# Patient Record
Sex: FEMALE | Race: WHITE | HISPANIC OR LATINO | URBAN - METROPOLITAN AREA
[De-identification: names, ages, dates, MRNs, and addresses within clinical notes are randomized per-mention and may not be internally consistent; named-entity substitution may affect disease eponyms.]

---

## 2017-04-19 ENCOUNTER — INPATIENT (INPATIENT)
Facility: HOSPITAL | Age: 72
LOS: 5 days | Discharge: HOME | End: 2017-04-25
Attending: INTERNAL MEDICINE | Admitting: INTERNAL MEDICINE
Payer: MEDICARE

## 2017-04-19 VITALS
RESPIRATION RATE: 18 BRPM | HEART RATE: 75 BPM | SYSTOLIC BLOOD PRESSURE: 190 MMHG | OXYGEN SATURATION: 100 % | TEMPERATURE: 98 F | DIASTOLIC BLOOD PRESSURE: 98 MMHG

## 2017-04-19 DIAGNOSIS — E78.5 HYPERLIPIDEMIA, UNSPECIFIED: ICD-10-CM

## 2017-04-19 DIAGNOSIS — I63.9 CEREBRAL INFARCTION, UNSPECIFIED: ICD-10-CM

## 2017-04-19 DIAGNOSIS — Z41.8 ENCOUNTER FOR OTHER PROCEDURES FOR PURPOSES OTHER THAN REMEDYING HEALTH STATE: ICD-10-CM

## 2017-04-19 DIAGNOSIS — I10 ESSENTIAL (PRIMARY) HYPERTENSION: ICD-10-CM

## 2017-04-19 DIAGNOSIS — E11.9 TYPE 2 DIABETES MELLITUS WITHOUT COMPLICATIONS: ICD-10-CM

## 2017-04-19 DIAGNOSIS — Z90.49 ACQUIRED ABSENCE OF OTHER SPECIFIED PARTS OF DIGESTIVE TRACT: Chronic | ICD-10-CM

## 2017-04-19 LAB
ALBUMIN SERPL ELPH-MCNC: 4.1 G/DL — SIGNIFICANT CHANGE UP (ref 3.3–5)
ALP SERPL-CCNC: 67 U/L — SIGNIFICANT CHANGE UP (ref 40–120)
ALT FLD-CCNC: 15 U/L — SIGNIFICANT CHANGE UP (ref 4–33)
APTT BLD: 31.6 SEC — SIGNIFICANT CHANGE UP (ref 27.5–37.4)
AST SERPL-CCNC: 28 U/L — SIGNIFICANT CHANGE UP (ref 4–32)
BASOPHILS # BLD AUTO: 0.03 K/UL — SIGNIFICANT CHANGE UP (ref 0–0.2)
BASOPHILS NFR BLD AUTO: 0.2 % — SIGNIFICANT CHANGE UP (ref 0–2)
BILIRUB SERPL-MCNC: 0.4 MG/DL — SIGNIFICANT CHANGE UP (ref 0.2–1.2)
BUN SERPL-MCNC: 23 MG/DL — SIGNIFICANT CHANGE UP (ref 7–23)
CALCIUM SERPL-MCNC: 10 MG/DL — SIGNIFICANT CHANGE UP (ref 8.4–10.5)
CHLORIDE SERPL-SCNC: 97 MMOL/L — LOW (ref 98–107)
CK MB BLD-MCNC: 1.22 NG/ML — SIGNIFICANT CHANGE UP (ref 1–4.7)
CK MB BLD-MCNC: SIGNIFICANT CHANGE UP (ref 0–2.5)
CK SERPL-CCNC: 88 U/L — SIGNIFICANT CHANGE UP (ref 25–170)
CO2 SERPL-SCNC: 27 MMOL/L — SIGNIFICANT CHANGE UP (ref 22–31)
CREAT SERPL-MCNC: 1.19 MG/DL — SIGNIFICANT CHANGE UP (ref 0.5–1.3)
EOSINOPHIL # BLD AUTO: 0.2 K/UL — SIGNIFICANT CHANGE UP (ref 0–0.5)
EOSINOPHIL NFR BLD AUTO: 1.4 % — SIGNIFICANT CHANGE UP (ref 0–6)
GLUCOSE SERPL-MCNC: 121 MG/DL — HIGH (ref 70–99)
HCT VFR BLD CALC: 40.9 % — SIGNIFICANT CHANGE UP (ref 34.5–45)
HGB BLD-MCNC: 13.1 G/DL — SIGNIFICANT CHANGE UP (ref 11.5–15.5)
IMM GRANULOCYTES NFR BLD AUTO: 0.3 % — SIGNIFICANT CHANGE UP (ref 0–1.5)
INR BLD: 0.96 — SIGNIFICANT CHANGE UP (ref 0.88–1.17)
LYMPHOCYTES # BLD AUTO: 20.9 % — SIGNIFICANT CHANGE UP (ref 13–44)
LYMPHOCYTES # BLD AUTO: 3.02 K/UL — SIGNIFICANT CHANGE UP (ref 1–3.3)
MCHC RBC-ENTMCNC: 30 PG — SIGNIFICANT CHANGE UP (ref 27–34)
MCHC RBC-ENTMCNC: 32 % — SIGNIFICANT CHANGE UP (ref 32–36)
MCV RBC AUTO: 93.6 FL — SIGNIFICANT CHANGE UP (ref 80–100)
MONOCYTES # BLD AUTO: 1.21 K/UL — HIGH (ref 0–0.9)
MONOCYTES NFR BLD AUTO: 8.4 % — SIGNIFICANT CHANGE UP (ref 2–14)
NEUTROPHILS # BLD AUTO: 9.94 K/UL — HIGH (ref 1.8–7.4)
NEUTROPHILS NFR BLD AUTO: 68.8 % — SIGNIFICANT CHANGE UP (ref 43–77)
PLATELET # BLD AUTO: 340 K/UL — SIGNIFICANT CHANGE UP (ref 150–400)
PMV BLD: 10.3 FL — SIGNIFICANT CHANGE UP (ref 7–13)
POTASSIUM SERPL-MCNC: 3.8 MMOL/L — SIGNIFICANT CHANGE UP (ref 3.5–5.3)
POTASSIUM SERPL-SCNC: 3.8 MMOL/L — SIGNIFICANT CHANGE UP (ref 3.5–5.3)
PROT SERPL-MCNC: 8.4 G/DL — HIGH (ref 6–8.3)
PROTHROM AB SERPL-ACNC: 10.7 SEC — SIGNIFICANT CHANGE UP (ref 9.8–13.1)
RBC # BLD: 4.37 M/UL — SIGNIFICANT CHANGE UP (ref 3.8–5.2)
RBC # FLD: 12.9 % — SIGNIFICANT CHANGE UP (ref 10.3–14.5)
SODIUM SERPL-SCNC: 141 MMOL/L — SIGNIFICANT CHANGE UP (ref 135–145)
TROPONIN T SERPL-MCNC: < 0.06 NG/ML — SIGNIFICANT CHANGE UP (ref 0–0.06)
WBC # BLD: 14.45 K/UL — HIGH (ref 3.8–10.5)
WBC # FLD AUTO: 14.45 K/UL — HIGH (ref 3.8–10.5)

## 2017-04-19 PROCEDURE — 71010: CPT | Mod: 26

## 2017-04-19 PROCEDURE — 70450 CT HEAD/BRAIN W/O DYE: CPT | Mod: 26

## 2017-04-19 PROCEDURE — 99223 1ST HOSP IP/OBS HIGH 75: CPT | Mod: GC

## 2017-04-19 RX ORDER — INSULIN LISPRO 100/ML
VIAL (ML) SUBCUTANEOUS AT BEDTIME
Qty: 0 | Refills: 0 | Status: DISCONTINUED | OUTPATIENT
Start: 2017-04-19 | End: 2017-04-25

## 2017-04-19 RX ORDER — HEPARIN SODIUM 5000 [USP'U]/ML
5000 INJECTION INTRAVENOUS; SUBCUTANEOUS EVERY 12 HOURS
Qty: 0 | Refills: 0 | Status: DISCONTINUED | OUTPATIENT
Start: 2017-04-19 | End: 2017-04-25

## 2017-04-19 RX ORDER — DEXTROSE 50 % IN WATER 50 %
25 SYRINGE (ML) INTRAVENOUS ONCE
Qty: 0 | Refills: 0 | Status: DISCONTINUED | OUTPATIENT
Start: 2017-04-19 | End: 2017-04-25

## 2017-04-19 RX ORDER — INSULIN LISPRO 100/ML
VIAL (ML) SUBCUTANEOUS
Qty: 0 | Refills: 0 | Status: DISCONTINUED | OUTPATIENT
Start: 2017-04-19 | End: 2017-04-25

## 2017-04-19 RX ORDER — SODIUM CHLORIDE 9 MG/ML
1000 INJECTION, SOLUTION INTRAVENOUS
Qty: 0 | Refills: 0 | Status: DISCONTINUED | OUTPATIENT
Start: 2017-04-19 | End: 2017-04-25

## 2017-04-19 RX ORDER — ACETAMINOPHEN 500 MG
650 TABLET ORAL ONCE
Qty: 0 | Refills: 0 | Status: COMPLETED | OUTPATIENT
Start: 2017-04-19 | End: 2017-04-19

## 2017-04-19 RX ORDER — ASPIRIN/CALCIUM CARB/MAGNESIUM 324 MG
81 TABLET ORAL DAILY
Qty: 0 | Refills: 0 | Status: DISCONTINUED | OUTPATIENT
Start: 2017-04-19 | End: 2017-04-25

## 2017-04-19 RX ORDER — ONDANSETRON 8 MG/1
4 TABLET, FILM COATED ORAL ONCE
Qty: 0 | Refills: 0 | Status: COMPLETED | OUTPATIENT
Start: 2017-04-19 | End: 2017-04-19

## 2017-04-19 RX ORDER — DEXTROSE 50 % IN WATER 50 %
1 SYRINGE (ML) INTRAVENOUS ONCE
Qty: 0 | Refills: 0 | Status: DISCONTINUED | OUTPATIENT
Start: 2017-04-19 | End: 2017-04-25

## 2017-04-19 RX ORDER — GLUCAGON INJECTION, SOLUTION 0.5 MG/.1ML
1 INJECTION, SOLUTION SUBCUTANEOUS ONCE
Qty: 0 | Refills: 0 | Status: DISCONTINUED | OUTPATIENT
Start: 2017-04-19 | End: 2017-04-25

## 2017-04-19 RX ORDER — DEXTROSE 50 % IN WATER 50 %
12.5 SYRINGE (ML) INTRAVENOUS ONCE
Qty: 0 | Refills: 0 | Status: DISCONTINUED | OUTPATIENT
Start: 2017-04-19 | End: 2017-04-25

## 2017-04-19 RX ORDER — ATORVASTATIN CALCIUM 80 MG/1
40 TABLET, FILM COATED ORAL AT BEDTIME
Qty: 0 | Refills: 0 | Status: DISCONTINUED | OUTPATIENT
Start: 2017-04-19 | End: 2017-04-20

## 2017-04-19 RX ORDER — HYDRALAZINE HCL 50 MG
5 TABLET ORAL ONCE
Qty: 0 | Refills: 0 | Status: COMPLETED | OUTPATIENT
Start: 2017-04-19 | End: 2017-04-19

## 2017-04-19 RX ADMIN — Medication 5 MILLIGRAM(S): at 20:45

## 2017-04-19 RX ADMIN — Medication 650 MILLIGRAM(S): at 18:56

## 2017-04-19 RX ADMIN — ONDANSETRON 4 MILLIGRAM(S): 8 TABLET, FILM COATED ORAL at 17:45

## 2017-04-19 NOTE — ED PROVIDER NOTE - PHYSICAL EXAMINATION
Locurto as below  +  right visual field defect  no temp artery tenderness  no TA opulses b/l Locurto as below  +  right visual field defect  no temp artery tenderness  no TA opulses b/l  Rosario: right homonymous hemianopsia, no motor deficits. Full ROM of neck, non-tender.

## 2017-04-19 NOTE — ED ADULT NURSE NOTE - OBJECTIVE STATEMENT
Float RN- Pt received to spot #7. AAOx4, c/o severe left sided headache acc with nausea and dizziness. Pt c/o decreased in vision since last night. Denies recent head injury/trauma. No stroke code called after being evaluated by MD. #20g IV placed to left ac, labs drawn and sent. Awaiting MD ramos. Report given off to primary RN in area. Family member at bedside. no acute distress. Awaiting CT and further plan of care.

## 2017-04-19 NOTE — H&P ADULT. - PROBLEM SELECTOR PLAN 2
Metoprolol and losartan held for permissive hypertension for 24 hours.   Routine blood pressure check.  DASH diet

## 2017-04-19 NOTE — H&P ADULT. - NEGATIVE CARDIOVASCULAR SYMPTOMS
no paroxysmal nocturnal dyspnea/no dyspnea on exertion/no chest pain/no palpitations/no peripheral edema/no orthopnea

## 2017-04-19 NOTE — H&P ADULT. - GASTROINTESTINAL DETAILS
nontender/no distention/no guarding/no masses palpable/bowel sounds normal/no rigidity/normal/soft/no rebound tenderness

## 2017-04-19 NOTE — ED PROVIDER NOTE - PROGRESS NOTE DETAILS
DD ED ATTG Stroke eval - 71F p/w R homonymous hemianopia x 6 hours since 10 am, but it has been happening intermittently x 1 day.  Also c/o R sided HA gradually worsening x 1 day.  Outside tPA window, will not call code stroke now, will order CT head. Called by radiology attending regarding non-con CT head showing acute stroke in left occipital region. COde stroke called but patient outside of intervention window.

## 2017-04-19 NOTE — ED PROVIDER NOTE - ATTENDING CONTRIBUTION TO CARE
Locurto  pt with sxs and CT c/w occipital infarct   By time  pt out of acute therapeutic window for TPA , michele lan Locurto  pt with sxs and CT c/w occipital infarct   By time  pt out of acute therapeutic window for TPA , neuro consulted

## 2017-04-19 NOTE — ED ADULT TRIAGE NOTE - CHIEF COMPLAINT QUOTE
pt c/o nausea and headache on lt side of head since last PM--pt has decreased vision in rt eye since 10am--pt evaluated for cva--no code stroke

## 2017-04-19 NOTE — ED ADULT NURSE REASSESSMENT NOTE - NS ED NURSE REASSESS COMMENT FT1
Pt received on stretcher in room 7 with family at bedside. Pt is awake, A&Ox4, NAD observed, respirations even and unlabored on room air. VS recorded. Elevated /73, hydralazine 5mg IVP administered as ordered. IV access 20G in left AC, dressing is clean, dry, intact. No redness, warmth, swelling, or pain at site. Pt on cardiac monitor, NSR observed, HR 71. Pt admitted to [facility] for Stroke, awaiting bed assignment.

## 2017-04-19 NOTE — ED PROVIDER NOTE - MEDICAL DECISION MAKING DETAILS
72 yo female with PMH of HTN, HLD, DM, c/o left sided HA  intermittent decreased vision from left eye which began yesterday and was intermittent  Visual change became constant since 10 am today. Code stroke activated but pt outside of TPA or interventional IR window. Plan: EKG, CT head, labs, neuro, zofran, admit

## 2017-04-19 NOTE — H&P ADULT. - HISTORY OF PRESENT ILLNESS
70 y/o F with h/o HTN, HLD, DM presents to the ED for left sided headache and decreased vision loss from her right eye. Pt states the headache started first and then her vision in her right eye started to deteriorate around 10 am this morning. Pt reports she can't see out of the corner of her right eye. Pt reports normal vision in her left eye. Pt report vision has not improved since then. Pt states she had an episode of nausea and vomiting before, nonbloody and nonbilious. Pt denies syncope, LOC, dizziness, fever, chills, chest pain, shortness of breath, palpitations, decreased sensation, facial droop, slurred speech, weakness, dysuria, urinary/bowel incontinence or any other complaints at this time. 70 y/o F with h/o HTN, HLD, DM presents to the ED for left sided headache and decreased vision loss from her right eye. Pt states the headache started first and then her vision in her right eye started to deteriorate around 10 am this morning. Pt reports she can't see out of the corner of her right eye. Pt reports normal vision in her left eye. Pt report vision has not improved since then. Pt states she had an episode of nausea and vomiting before, nonbloody and nonbilious. Pt denies syncope, LOC, dizziness, fever, chills, chest pain, shortness of breath, palpitations, decreased sensation, facial droop, slurred speech, weakness, dysuria, urinary/bowel incontinence or any other complaints at this time.     In the ED, patient was seen and evaluated. Pt's CT head show acute infarct of left occipital lobe. Pt was seen and evaluated by neurology who recommended a stroke work up. Pt is currently stable and in no acute distress. Vitals are HR 68 / 75, R 17, Sp02 100%. Pt is admitted to telemetry.

## 2017-04-19 NOTE — H&P ADULT. - PROBLEM SELECTOR PLAN 1
Admit to telemetry.  Neurology consult appreciated- MRI brain w/o contrast, MRA head w/o contrast, MRA neck w/ contrast, TTE with bubbly study, ASA, lipitor. Permissive HTN up to 205/110 for 24 hours. Neuro checks every 4 hours.   check cbc, tsh, lipid, hemoglobin a1c, bmp with mag and phos,  f/u MD note Admit to telemetry.  Neurology consult appreciated- MRI brain w/o contrast, MRA head w/o contrast, MRA neck w/ contrast, TTE with bubbly study, ASA, lipitor. Permissive HTN up to 205/110 for 24 hours. Neuro checks every 4 hours.   check cbc, tsh, lipid, hemoglobin a1c, bmp with mag and phos,

## 2017-04-19 NOTE — H&P ADULT. - RS GEN PE MLT RESP DETAILS PC
no chest wall tenderness/no intercostal retractions/airway patent/no wheezes/clear to auscultation bilaterally/no rhonchi/normal/breath sounds equal/good air movement/respirations non-labored/no rales

## 2017-04-19 NOTE — H&P ADULT. - NEGATIVE NEUROLOGICAL SYMPTOMS
no focal seizures/no loss of consciousness/no tremors/no syncope/no loss of sensation/no generalized seizures/no vertigo/no hemiparesis/no confusion/no difficulty walking/no transient paralysis/no facial palsy/no paresthesias/no weakness

## 2017-04-19 NOTE — H&P ADULT. - ASSESSMENT
70 y/o F with h/o HTN, HLD, DM presents to the ED for left sided headache and decreased vision loss from her right eye.Admit to telemetry for CVA.

## 2017-04-19 NOTE — ED PROVIDER NOTE - OBJECTIVE STATEMENT
pt c/o left sided HA  intermittent decreased vision from left eye which began yesterday and was intermittent  Visual change became constant since 10 am today.  Pt denies weakness numbness gait difficulty  chest pain or SOB  Pt denies prior h/o CVA 72 yo female with PMH of HTN, HLD, DM, c/o left sided HA  intermittent decreased vision from left eye which began yesterday and was intermittent  Visual change became constant since 10 am today.  Pt denies weakness numbness gait difficulty  chest pain or SOB  Pt denies prior h/o CVA. Not on anticoagulation other than ASA. Denies head trauma. + nausea, vomiting in ED exam room. Zofran ordered. Code stroke called and neuro at bedside.

## 2017-04-19 NOTE — ED ADULT NURSE REASSESSMENT NOTE - NS ED NURSE REASSESS COMMENT FT1
Pt received bed assignment in [facility], pt transferred to [facility] at 23:30. Report given to receiving OSCAR Oakley.

## 2017-04-19 NOTE — H&P ADULT. - PROBLEM SELECTOR PLAN 4
Check hemoglobin a1c.   Stop PO medications and start sliding scale.   Routine glucose check.   Low carb diet

## 2017-04-20 LAB
APPEARANCE UR: CLEAR — SIGNIFICANT CHANGE UP
BACTERIA # UR AUTO: SIGNIFICANT CHANGE UP
BILIRUB UR-MCNC: NEGATIVE — SIGNIFICANT CHANGE UP
BLOOD UR QL VISUAL: NEGATIVE — SIGNIFICANT CHANGE UP
BUN SERPL-MCNC: 23 MG/DL — SIGNIFICANT CHANGE UP (ref 7–23)
CALCIUM SERPL-MCNC: 9.7 MG/DL — SIGNIFICANT CHANGE UP (ref 8.4–10.5)
CHLORIDE SERPL-SCNC: 99 MMOL/L — SIGNIFICANT CHANGE UP (ref 98–107)
CHOLEST SERPL-MCNC: 196 MG/DL — SIGNIFICANT CHANGE UP (ref 120–199)
CK MB BLD-MCNC: 1.04 NG/ML — SIGNIFICANT CHANGE UP (ref 1–4.7)
CK SERPL-CCNC: 83 U/L — SIGNIFICANT CHANGE UP (ref 25–170)
CO2 SERPL-SCNC: 26 MMOL/L — SIGNIFICANT CHANGE UP (ref 22–31)
COLOR SPEC: SIGNIFICANT CHANGE UP
CREAT SERPL-MCNC: 1.18 MG/DL — SIGNIFICANT CHANGE UP (ref 0.5–1.3)
GLUCOSE SERPL-MCNC: 148 MG/DL — HIGH (ref 70–99)
GLUCOSE UR-MCNC: NEGATIVE — SIGNIFICANT CHANGE UP
HBA1C BLD-MCNC: 8.4 % — HIGH (ref 4–5.6)
HCT VFR BLD CALC: 41 % — SIGNIFICANT CHANGE UP (ref 34.5–45)
HDLC SERPL-MCNC: 51 MG/DL — SIGNIFICANT CHANGE UP (ref 45–65)
HGB BLD-MCNC: 13.1 G/DL — SIGNIFICANT CHANGE UP (ref 11.5–15.5)
KETONES UR-MCNC: NEGATIVE — SIGNIFICANT CHANGE UP
LEUKOCYTE ESTERASE UR-ACNC: NEGATIVE — SIGNIFICANT CHANGE UP
LIPID PNL WITH DIRECT LDL SERPL: 108 MG/DL — SIGNIFICANT CHANGE UP
MAGNESIUM SERPL-MCNC: 1.6 MG/DL — SIGNIFICANT CHANGE UP (ref 1.6–2.6)
MCHC RBC-ENTMCNC: 29.6 PG — SIGNIFICANT CHANGE UP (ref 27–34)
MCHC RBC-ENTMCNC: 32 % — SIGNIFICANT CHANGE UP (ref 32–36)
MCV RBC AUTO: 92.6 FL — SIGNIFICANT CHANGE UP (ref 80–100)
MUCOUS THREADS # UR AUTO: SIGNIFICANT CHANGE UP
NITRITE UR-MCNC: NEGATIVE — SIGNIFICANT CHANGE UP
PH UR: 6 — SIGNIFICANT CHANGE UP (ref 4.6–8)
PHOSPHATE SERPL-MCNC: 5 MG/DL — HIGH (ref 2.5–4.5)
PLATELET # BLD AUTO: 308 K/UL — SIGNIFICANT CHANGE UP (ref 150–400)
PMV BLD: 10.3 FL — SIGNIFICANT CHANGE UP (ref 7–13)
POTASSIUM SERPL-MCNC: 3.8 MMOL/L — SIGNIFICANT CHANGE UP (ref 3.5–5.3)
POTASSIUM SERPL-SCNC: 3.8 MMOL/L — SIGNIFICANT CHANGE UP (ref 3.5–5.3)
PROT UR-MCNC: NEGATIVE — SIGNIFICANT CHANGE UP
RBC # BLD: 4.43 M/UL — SIGNIFICANT CHANGE UP (ref 3.8–5.2)
RBC # FLD: 13.2 % — SIGNIFICANT CHANGE UP (ref 10.3–14.5)
RBC CASTS # UR COMP ASSIST: SIGNIFICANT CHANGE UP (ref 0–?)
SODIUM SERPL-SCNC: 138 MMOL/L — SIGNIFICANT CHANGE UP (ref 135–145)
SP GR SPEC: 1.01 — SIGNIFICANT CHANGE UP (ref 1–1.03)
SQUAMOUS # UR AUTO: SIGNIFICANT CHANGE UP
TRIGL SERPL-MCNC: 317 MG/DL — HIGH (ref 10–149)
TROPONIN T SERPL-MCNC: < 0.06 NG/ML — SIGNIFICANT CHANGE UP (ref 0–0.06)
TSH SERPL-MCNC: 1.78 UIU/ML — SIGNIFICANT CHANGE UP (ref 0.27–4.2)
UROBILINOGEN FLD QL: NORMAL E.U. — SIGNIFICANT CHANGE UP (ref 0.1–0.2)
WBC # BLD: 10.06 K/UL — SIGNIFICANT CHANGE UP (ref 3.8–10.5)
WBC # FLD AUTO: 10.06 K/UL — SIGNIFICANT CHANGE UP (ref 3.8–10.5)
WBC UR QL: SIGNIFICANT CHANGE UP (ref 0–?)

## 2017-04-20 PROCEDURE — 93280 PM DEVICE PROGR EVAL DUAL: CPT | Mod: 26

## 2017-04-20 PROCEDURE — 70548 MR ANGIOGRAPHY NECK W/DYE: CPT | Mod: 26

## 2017-04-20 PROCEDURE — 99233 SBSQ HOSP IP/OBS HIGH 50: CPT

## 2017-04-20 PROCEDURE — 70551 MRI BRAIN STEM W/O DYE: CPT | Mod: 26

## 2017-04-20 PROCEDURE — 99223 1ST HOSP IP/OBS HIGH 75: CPT

## 2017-04-20 PROCEDURE — 70544 MR ANGIOGRAPHY HEAD W/O DYE: CPT | Mod: 26,59

## 2017-04-20 RX ORDER — ATORVASTATIN CALCIUM 80 MG/1
80 TABLET, FILM COATED ORAL AT BEDTIME
Qty: 0 | Refills: 0 | Status: DISCONTINUED | OUTPATIENT
Start: 2017-04-20 | End: 2017-04-25

## 2017-04-20 RX ADMIN — HEPARIN SODIUM 5000 UNIT(S): 5000 INJECTION INTRAVENOUS; SUBCUTANEOUS at 06:29

## 2017-04-20 RX ADMIN — Medication 1: at 22:38

## 2017-04-20 RX ADMIN — ATORVASTATIN CALCIUM 80 MILLIGRAM(S): 80 TABLET, FILM COATED ORAL at 22:40

## 2017-04-20 RX ADMIN — Medication 81 MILLIGRAM(S): at 12:04

## 2017-04-20 RX ADMIN — HEPARIN SODIUM 5000 UNIT(S): 5000 INJECTION INTRAVENOUS; SUBCUTANEOUS at 18:08

## 2017-04-20 RX ADMIN — Medication: at 12:05

## 2017-04-20 NOTE — DISCHARGE NOTE ADULT - PATIENT PORTAL LINK FT
“You can access the [employer] Patient Portal, offered by [facility], by registering with the following website: [email or web address]”

## 2017-04-20 NOTE — DISCHARGE NOTE ADULT - ADDITIONAL INSTRUCTIONS
follow up with Dr. Gonzalez in 1-2 weeks   follow up with Neurologist in 1-2 weeks follow up with Dr. Gonzalez in 1-2 weeks   follow up with Neurologist in 1-2 weeks  --> Dr Wyatt Chatterjee -- neurosurgery -- monitoring of aneurysms -- [phone]

## 2017-04-20 NOTE — DISCHARGE NOTE ADULT - MEDICATION SUMMARY - MEDICATIONS TO TAKE
I will START or STAY ON the medications listed below when I get home from the hospital:    aspirin 81 mg oral tablet  -- 1 tab(s) by mouth once a day  -- Indication: For CVA (cerebral vascular accident)    losartan 50 mg oral tablet  -- 1 tab(s) by mouth once a day  -- Indication: For Hypertension    metFORMIN 1000 mg oral tablet  -- 1 tab(s) by mouth 2 times a day  -- Indication: For Diabetes    glipiZIDE 10 mg oral tablet  -- 1 tab(s) by mouth 2 times a day  -- Indication: For Diabetes    atorvastatin 80 mg oral tablet  -- 1 tab(s) by mouth once a day (at bedtime)  -- Indication: For Hyperlipidemia    metoprolol succinate 50 mg oral tablet, extended release  -- 1 tab(s) by mouth once a day  -- Indication: For Hypertension I will START or STAY ON the medications listed below when I get home from the hospital:    aspirin 81 mg oral tablet  -- 1 tab(s) by mouth once a day  -- Indication: For CVA (cerebral vascular accident)    losartan 50 mg oral tablet  -- 1 tab(s) by mouth once a day  -- Indication: For Hypertension    metFORMIN 1000 mg oral tablet  -- 1 tab(s) by mouth 2 times a day  -- Indication: For Diabetes    glipiZIDE 10 mg oral tablet  -- 1 tab(s) by mouth 2 times a day  -- Indication: For Diabetes    atorvastatin 80 mg oral tablet  -- 1 tab(s) by mouth once a day (at bedtime)  -- Indication: For Hyperlipidemia    clopidogrel 75 mg oral tablet  -- 1 tab(s) by mouth once a day x 3 weeks   -- Indication: For CVA (cerebral vascular accident) I will START or STAY ON the medications listed below when I get home from the hospital:    aspirin 81 mg oral tablet  -- 1 tab(s) by mouth once a day  -- Indication: For CVA (cerebral vascular accident)    losartan 25 mg oral tablet  -- 3 tab(s) by mouth once a day  -- Indication: For Hypertension    metFORMIN 1000 mg oral tablet  -- 1 tab(s) by mouth 2 times a day  -- Indication: For Diabetes    glipiZIDE 10 mg oral tablet  -- 1 tab(s) by mouth 2 times a day  -- Indication: For Diabetes    atorvastatin 80 mg oral tablet  -- 1 tab(s) by mouth once a day (at bedtime)  -- Indication: For Hyperlipidemia, CVA( stroke)     clopidogrel 75 mg oral tablet  -- 1 tab(s) by mouth once a day x 3 weeks   -- Indication: For CVA (cerebral vascular accident) I will START or STAY ON the medications listed below when I get home from the hospital:    aspirin 81 mg oral tablet  -- 1 tab(s) by mouth once a day  -- Indication: For CVA (cerebral vascular accident)    losartan 100 mg oral tablet  -- 1 tab(s) by mouth once a day  -- Indication: For HTN    metFORMIN 1000 mg oral tablet  -- 1 tab(s) by mouth 2 times a day  -- Indication: For Diabetes    glipiZIDE 10 mg oral tablet  -- 1 tab(s) by mouth 2 times a day  -- Indication: For Diabetes    atorvastatin 80 mg oral tablet  -- 1 tab(s) by mouth once a day (at bedtime)  -- Indication: For Hyperlipidemia, CVA( stroke)     clopidogrel 75 mg oral tablet  -- 1 tab(s) by mouth once a day x 3 weeks   -- Indication: For CVA (cerebral vascular accident) I will START or STAY ON the medications listed below when I get home from the hospital:    aspirin 81 mg oral tablet  -- 1 tab(s) by mouth once a day  -- Indication: For CVA (cerebral vascular accident)    losartan 100 mg oral tablet  -- 1 tab(s) by mouth once a day  -- Indication: For HTN    metFORMIN 1000 mg oral tablet  -- 1 tab(s) by mouth 2 times a day  -- Indication: For Diabetes    glipiZIDE 10 mg oral tablet  -- 1 tab(s) by mouth 2 times a day  -- Indication: For Diabetes    atorvastatin 80 mg oral tablet  -- 1 tab(s) by mouth once a day (at bedtime)  -- Indication: For Hyperlipidemia, CVA( stroke)     clopidogrel 75 mg oral tablet  -- 1 tab(s) by mouth once a day x 30 days  -- Indication: For CAD

## 2017-04-20 NOTE — OCCUPATIONAL THERAPY INITIAL EVALUATION ADULT - PERTINENT HX OF CURRENT PROBLEM, REHAB EVAL
Pt is a 71 year old F with hx of HTN, HLD, & DM who presents to [facility] on 4/19/17 with left sided headache and decreased vision loss from her right eye. CT Head No Contrast on 4/19/17 displays findings are consistent with an acute infarct of the left occipital lobe, without hemorrhagic transformation.

## 2017-04-20 NOTE — OCCUPATIONAL THERAPY INITIAL EVALUATION ADULT - MD ORDER
Occupational Therapy to evaluate and treat. Occupational Therapy to evaluate and treat. OOB with assistance. Ambulate with assistance. Increase as Tolerated. Per OSCAR Cueva, pt is okay to participate in OT evaluation and perform activity as tolerated.

## 2017-04-20 NOTE — PHYSICAL THERAPY INITIAL EVALUATION ADULT - PERTINENT HX OF CURRENT PROBLEM, REHAB EVAL
72 y/o F with h/o HTN, HLD, DM presents to the ED for left sided headache and decreased vision loss from her right eye. Pt states the headache started first and then her vision in her right eye started to deteriorate around 10 am this morning. Pt reports she can't see out of the corner of her right eye. Pt reports normal vision in her left eye. In the ED, patient was seen and evaluated. Pt's CT head show acute infarct of left occipital lobe.

## 2017-04-20 NOTE — DISCHARGE NOTE ADULT - MEDICATION SUMMARY - MEDICATIONS TO STOP TAKING
I will STOP taking the medications listed below when I get home from the hospital:  None I will STOP taking the medications listed below when I get home from the hospital:    metoprolol succinate 50 mg oral tablet, extended release  -- 1 tab(s) by mouth once a day

## 2017-04-20 NOTE — OCCUPATIONAL THERAPY INITIAL EVALUATION ADULT - PLANNED THERAPY INTERVENTIONS, OT EVAL
balance training/ADL retraining/strengthening/cognitive, visual perceptual/bed mobility training/ROM/transfer training

## 2017-04-20 NOTE — DISCHARGE NOTE ADULT - NS AS DC STROKE ED MATERIALS
Call 911 for Stroke/Prescribed Medications/Need for Followup After Discharge/Risk Factors for Stroke/Stroke Warning Signs and Symptoms/Stroke Education Booklet

## 2017-04-20 NOTE — OCCUPATIONAL THERAPY INITIAL EVALUATION ADULT - LEVEL OF INDEPENDENCE: SIT/SUPINE, REHAB EVAL
NT; Pt. left sitting at EOB as received. NAD. Call bell in reach. All lines intact & all precautions maintained. OSCAR Cueva made aware & acknowledged. Family  bedside.

## 2017-04-20 NOTE — DISCHARGE NOTE ADULT - NS AS ACTIVITY OBS
activity as tolerated/Stairs allowed/Walking-Outdoors allowed/Walking-Indoors allowed/Showering allowed

## 2017-04-20 NOTE — DISCHARGE NOTE ADULT - HOSPITAL COURSE
72 y/o F with h/o HTN, HLD, DM presents to the ED for left sided headache and decreased vision loss from her right eye. Pt states the headache started first and then her vision in her right eye started to deteriorate around 10 am this morning. Pt reports she can't see out of the corner of her right eye. Pt reports normal vision in her left eye. Pt report vision has not improved since then. Pt states she had an episode of nausea and vomiting before, nonbloody and nonbilious. Pt denies syncope, LOC, dizziness, fever, chills, chest pain, shortness of breath, palpitations, decreased sensation, facial droop, slurred speech, weakness, dysuria, urinary/bowel incontinence or any other complaints at this time.     In the ED, patient was seen and evaluated. Pt's CT head show acute infarct of left occipital lobe. Pt was seen and evaluated by neurology who recommended a stroke work up. Pt is currently stable and in no acute distress. Vitals are HR 68 / 75, R 17, Sp02 100%. Pt is admitted to telemetry.     EKG: NSR @ 63 bpm TWI in I, II,V4-V6, Q wave in AVL, and V2   CE x 2 neg           WBC 14.45             UA negs  4/19 CXR - clear lungs  4/19 CT Head - The exam findings are most consistent with an acute infarct of the left occipital lobe, without hemorrhagic transformation.  4/19 [facility] c/s - neuro check every 4 hours, MRI brain w/o contrast, MRA head w/o contrast and neck w/ contrast, Echo with bubble study, hemoglobin a1c, lipid, Permissive HTN up to 205/110, Loop recorder  4/20/17 > MRI Brain / MRA H / N >> MRI brain: Left medial occipital lobe acute infarct without hemorrhagic transformation.  MRA brain: Loss of signal of the left P2 and P3 segments indicating   high-grade stenosis. Additional focal stenosis at the origin of the left   P1. Other vascular stenoses as described above.  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  MRA neck: Tortuosity of the great vessels which may be seen with   hypertension.  4/20/17 > MRI + left occipital infarct w/o ICS + left ICA aneurysm, CVA ? embolic TTE w bubble study, Neurosx c/s, ILR, Permissive HTN 24-48 hrs,     4/20/17 > Stroke service - neuro - Neurosx c/s suspect she'll benefit from elective treatment of aneurysms   4/20/17 > NeuroSx - Left occipital CVA found to have high grade stenosis, left ICA aneurysms, - no evidence of rupture no acute neurosurgical intervention, management per Neurology for acute stroke, once d/c pt to see Dr Wyatt Chatterjee as OP for close aneurysm f/u   4/20/17 - EP - ILR for tomorrow  4/21 EP: s/p ILR 70 y/o F with h/o HTN, HLD, DM presents to the ED for left sided headache and decreased vision loss from her right eye. Pt states the headache started first and then her vision in her right eye started to deteriorate around 10 am this morning. Pt reports she can't see out of the corner of her right eye. Pt reports normal vision in her left eye. Pt report vision has not improved since then. Pt states she had an episode of nausea and vomiting before, nonbloody and nonbilious. Pt denies syncope, LOC, dizziness, fever, chills, chest pain, shortness of breath, palpitations, decreased sensation, facial droop, slurred speech, weakness, dysuria, urinary/bowel incontinence or any other complaints at this time.     In the ED, patient was seen and evaluated. Pt's CT head show acute infarct of left occipital lobe. Pt was seen and evaluated by neurology who recommended a stroke work up. Pt is currently stable and in no acute distress. Vitals are HR 68 / 75, R 17, Sp02 100%. Pt is admitted to telemetry.     EKG: NSR @ 63 bpm TWI in I, II,V4-V6, Q wave in AVL, and V2   CE x 2 neg           WBC 14.45             UA negs  4/19 CXR - clear lungs  4/19 CT Head - The exam findings are most consistent with an acute infarct of the left occipital lobe, without hemorrhagic transformation.  4/19 [facility] c/s - neuro check every 4 hours, MRI brain w/o contrast, MRA head w/o contrast and neck w/ contrast, Echo with bubble study, hemoglobin a1c, lipid, Permissive HTN up to 205/110, Loop recorder  4/20/17 > MRI Brain / MRA H / N >> MRI brain: Left medial occipital lobe acute infarct without hemorrhagic transformation.  MRA brain: Loss of signal of the left P2 and P3 segments indicating   high-grade stenosis. Additional focal stenosis at the origin of the left   P1. Other vascular stenoses as described above.  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  MRA neck: Tortuosity of the great vessels which may be seen with   hypertension.  4/20/17 > MRI + left occipital infarct w/o ICS + left ICA aneurysm, CVA ? embolic TTE w bubble study, Neurosx c/s, ILR, Permissive HTN 24-48 hrs,     4/20/17 > Stroke service - neuro - Neurosx c/s suspect she'll benefit from elective treatment of aneurysms   4/20/17 > NeuroSx - Left occipital CVA found to have high grade stenosis, left ICA aneurysms, - no evidence of rupture no acute neurosurgical intervention, management per Neurology for acute stroke, once d/c pt to see Dr Wyatt Chatterjee as OP for close aneurysm f/u   4/20/17 - EP - ILR for tomorrow  4/21 EP: s/p ILR    Patient medically cleared for discharge, continue current meds as prescribed, follow up with Neurologist and NeuroSurgery. 72 y/o F with h/o HTN, HLD, DM presents to the ED for left sided headache and decreased vision loss from her right eye. Pt states the headache started first and then her vision in her right eye started to deteriorate around 10 am this morning. Pt reports she can't see out of the corner of her right eye. Pt reports normal vision in her left eye. Pt report vision has not improved since then. Pt states she had an episode of nausea and vomiting before, nonbloody and nonbilious. Pt denies syncope, LOC, dizziness, fever, chills, chest pain, shortness of breath, palpitations, decreased sensation, facial droop, slurred speech, weakness, dysuria, urinary/bowel incontinence or any other complaints at this time.     In the ED, patient was seen and evaluated. Pt's CT head show acute infarct of left occipital lobe. Pt was seen and evaluated by neurology who recommended a stroke work up. Pt is currently stable and in no acute distress. Vitals are HR 68 / 75, R 17, Sp02 100%. Pt is admitted to telemetry.     EKG: NSR @ 63 bpm TWI in I, II,V4-V6, Q wave in AVL, and V2   CE x 2 neg           WBC 14.45             UA negs  4/19 CXR - clear lungs  4/19 CT Head - The exam findings are most consistent with an acute infarct of the left occipital lobe, without hemorrhagic transformation.  4/19 [facility] c/s - neuro check every 4 hours, MRI brain w/o contrast, MRA head w/o contrast and neck w/ contrast, Echo with bubble study, hemoglobin a1c, lipid, Permissive HTN up to 205/110, Loop recorder  4/20/17 > MRI Brain / MRA H / N >> MRI brain: Left medial occipital lobe acute infarct without hemorrhagic transformation.  MRA brain: Loss of signal of the left P2 and P3 segments indicating   high-grade stenosis. Additional focal stenosis at the origin of the left   P1. Other vascular stenoses as described above.  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  MRA neck: Tortuosity of the great vessels which may be seen with   hypertension.  4/20/17 > MRI + left occipital infarct w/o ICS + left ICA aneurysm, CVA ? embolic TTE w bubble study, Neurosx c/s, ILR, Permissive HTN 24-48 hrs,     4/20/17 > Stroke service - neuro - Neurosx c/s suspect she'll benefit from elective treatment of aneurysms   4/20/17 > NeuroSx - Left occipital CVA found to have high grade stenosis, left ICA aneurysms, - no evidence of rupture no acute neurosurgical intervention, management per Neurology for acute stroke, once d/c pt to see Dr Wyatt Chatterjee as OP for close aneurysm f/u   4/20/17 - EP - ILR for tomorrow  4/21 EP: s/p ILR  s/p Echo: --------------------------------------------    Patient medically cleared for discharge, continue current meds as prescribed, follow up with Neurologist and NeuroSurgery. 70 y/o F with h/o HTN, HLD, DM presents to the ED for left sided headache and decreased vision loss from her right eye. Pt states the headache started first and then her vision in her right eye started to deteriorate around 10 am this morning. Pt reports she can't see out of the corner of her right eye. Pt reports normal vision in her left eye. Pt report vision has not improved since then. Pt states she had an episode of nausea and vomiting before, nonbloody and nonbilious. Pt denies syncope, LOC, dizziness, fever, chills, chest pain, shortness of breath, palpitations, decreased sensation, facial droop, slurred speech, weakness, dysuria, urinary/bowel incontinence or any other complaints at this time.     In the ED, patient was seen and evaluated. Pt's CT head show acute infarct of left occipital lobe. Pt was seen and evaluated by neurology who recommended a stroke work up. Pt is currently stable and in no acute distress. Vitals are HR 68 / 75, R 17, Sp02 100%. Pt is admitted to telemetry.     EKG: NSR @ 63 bpm TWI in I, II,V4-V6, Q wave in AVL, and V2   CE x 2 neg           WBC 14.45             UA negs  4/19 CXR - clear lungs  4/19 CT Head - The exam findings are most consistent with an acute infarct of the left occipital lobe, without hemorrhagic transformation.  4/19 [facility] c/s - neuro check every 4 hours, MRI brain w/o contrast, MRA head w/o contrast and neck w/ contrast, Echo with bubble study, hemoglobin a1c, lipid, Permissive HTN up to 205/110, Loop recorder  4/20/17 > MRI Brain / MRA H / N >> MRI brain: Left medial occipital lobe acute infarct without hemorrhagic transformation.  MRA brain: Loss of signal of the left P2 and P3 segments indicating   high-grade stenosis. Additional focal stenosis at the origin of the left   P1. Other vascular stenoses as described above.  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  MRA neck: Tortuosity of the great vessels which may be seen with   hypertension.  4/20/17 > MRI + left occipital infarct w/o ICS + left ICA aneurysm, CVA ? embolic TTE w bubble study, Neurosx c/s, ILR, Permissive HTN 24-48 hrs,     4/20/17 > Stroke service - neuro - Neurosx c/s suspect she'll benefit from elective treatment of aneurysms   4/20/17 > NeuroSx - Left occipital CVA found to have high grade stenosis, left ICA aneurysms, - no evidence of rupture no acute neurosurgical intervention, management per Neurology for acute stroke, once d/c pt to see Dr Wyatt Chatterjee as OP for close aneurysm f/u     4/21 EP: s/p ILR  s/p Echo: --------------------------------------------    Patient medically cleared for discharge, continue current meds as prescribed, follow up with Neurologist and NeuroSurgery. 72 y/o F with h/o HTN, HLD, DM presents to the ED for left sided headache and decreased vision loss from her right eye. Pt states the headache started first and then her vision in her right eye started to deteriorate around 10 am this morning. Pt reports she can't see out of the corner of her right eye. Pt reports normal vision in her left eye. Pt report vision has not improved since then. Pt states she had an episode of nausea and vomiting before, nonbloody and nonbilious. Pt denies syncope, LOC, dizziness, fever, chills, chest pain, shortness of breath, palpitations, decreased sensation, facial droop, slurred speech, weakness, dysuria, urinary/bowel incontinence or any other complaints at this time.     In the ED, patient was seen and evaluated. Pt's CT head show acute infarct of left occipital lobe. Pt was seen and evaluated by neurology who recommended a stroke work up. Pt is currently stable and in no acute distress. Vitals are HR 68 / 75, R 17, Sp02 100%. Pt is admitted to telemetry.     EKG: NSR @ 63 bpm TWI in I, II,V4-V6, Q wave in AVL, and V2   CE x 2 neg           WBC 14.45             UA negs  4/19 CXR - clear lungs  4/19 CT Head - The exam findings are most consistent with an acute infarct of the left occipital lobe, without hemorrhagic transformation.  4/19 [facility] c/s - neuro check every 4 hours, MRI brain w/o contrast, MRA head w/o contrast and neck w/ contrast, Echo with bubble study, hemoglobin a1c, lipid, Permissive HTN up to 205/110, Loop recorder  4/20/17 > MRI Brain / MRA H / N >> MRI brain: Left medial occipital lobe acute infarct without hemorrhagic transformation.  MRA brain: Loss of signal of the left P2 and P3 segments indicating   high-grade stenosis. Additional focal stenosis at the origin of the left   P1. Other vascular stenoses as described above.  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  MRA neck: Tortuosity of the great vessels which may be seen with   hypertension.  4/20/17 > MRI + left occipital infarct w/o ICS + left ICA aneurysm, CVA ? embolic TTE w bubble study, Neurosx c/s, ILR, Permissive HTN 24-48 hrs,     4/20/17 > Stroke service - neuro - Neurosx c/s suspect she'll benefit from elective treatment of aneurysms   4/20/17 > NeuroSx - Left occipital CVA found to have high grade stenosis, left ICA aneurysms, - no evidence of rupture no acute neurosurgical intervention, management per Neurology for acute stroke, once d/c pt to see Dr Wyatt Chatterjee as OP for close aneurysm f/u     4/21 EP: s/p ILR  s/p Echo: LVEF 71% Calcified trileaflet aortic valve with decreased opening. Peak transaortic valve gradient equals 24 mm Hg, mean transaortic valve gradient equals 14 mm Hg, consistent with mild aortic stenosis. Mild aortic regurgitation. 2. Mildly dilated left atrium.  LA volume index = 35 cc/m2. 3. Eccentric left ventricular hypertrophy (dilated leftventricle with normal relative wall thickness). 4. Normal left ventricular systolic function. No segmental wall motion abnormalities. 5. Mild diastolic dysfunction (Stage I). 6. Normal right ventricular size and function.      Patient medically cleared for discharge, continue current meds as prescribed, follow up with Neurologist and NeuroSurgery.

## 2017-04-20 NOTE — DISCHARGE NOTE ADULT - PLAN OF CARE
prevent further stroke continue aspirin and lipitor   follow up with Neurologist in 1-2 weeks continue antihypertensives, low salt continue lipitor, low cholesterol consistent carbohydrate diet, continue oral diabetic meds follow up with Neuro Surgery Dr. Wyatt Chatterjee for close monitoring of aneurysms continue aspirin and lipitor   follow up with Neurologist Dr. Libman in 1-2 weeks continue antihypertensives, low salt diet continue lipitor, low cholesterol diet consistent carbohydrate diet, continue oral diabetic meds, follow up with your PCP in 1-2 weeks, your A1c 8.4 and you need better diabetic management continue aspirin and Lipitor   follow up with Neurologist Dr. Libman in 1-2 weeks continue antihypertensives, low salt diet;  Losartan was increased to 75 mg daily;  follow up with PCP in 1 week continue Lipitor, low cholesterol diet consistent carbohydrate diet, continue oral diabetic meds, follow up with your PCP in 1-2 weeks, your A1c 8.4 and you need better diabetic management, monitor Fingerstick at home,

## 2017-04-20 NOTE — OCCUPATIONAL THERAPY INITIAL EVALUATION ADULT - LIVES WITH, PROFILE
Pt. reports she lives with her daughter in an apartment building with 5 steps to enter. Once inside, pt. reports she has 3 full flights of steps to negotiate to reach her apartment located on the 3rd floor. Pt states she has no steps within her apartment itself. Per pt., she has a bathtub in her bathroom.

## 2017-04-20 NOTE — DISCHARGE NOTE ADULT - CARE PROVIDER_API CALL
Libman, Richard B (MD), Neurology; Vascular Neurology  [address] [place] [address]  Phone: [phone]  Fax: [phone]    Dr. Gonzalez,   Phone: (   )    -  Fax: (   )    - Libman, Richard B (MD), Neurology; Vascular Neurology  [address] [place] [address]  Phone: [phone]  Fax: [phone]    Carlos, --PCP  Phone: (   )    -  Fax: (   )    -    Wyatt Chatterjee), Neurological Surgery  [address] [place] [address]  Phone: [phone]  Fax: [phone]

## 2017-04-20 NOTE — OCCUPATIONAL THERAPY INITIAL EVALUATION ADULT - ANTICIPATED DISCHARGE DISPOSITION, OT EVAL
Anticipate Home with no skilled OT services. Pt. may benefit from PT services. Recommend pt. to follow up with vision specialist/neuro ophthalmologist.

## 2017-04-20 NOTE — PHYSICAL THERAPY INITIAL EVALUATION ADULT - CRITERIA FOR SKILLED THERAPEUTIC INTERVENTIONS
functional limitations in following categories/bed mobility, transfers, ambulation, stair negotiation

## 2017-04-20 NOTE — DISCHARGE NOTE ADULT - CARE PROVIDERS DIRECT ADDRESSES
,[email or web address],DirectAddress_Unknown,DirectAddress_Unknown ,[email or web address],DirectAddress_Unknown,[email or web address],DirectAddress_Unknown

## 2017-04-20 NOTE — OCCUPATIONAL THERAPY INITIAL EVALUATION ADULT - GENERAL OBSERVATIONS, REHAB EVAL
Pt. received sitting at EOB. NAD. +Heplock, +Tele. Family present bedside. PT present bedside to perform PT evaluation in conjunction w/ OT evaluation.

## 2017-04-20 NOTE — PHYSICAL THERAPY INITIAL EVALUATION ADULT - PLANNED THERAPY INTERVENTIONS, PT EVAL
transfer training/bed mobility training/gait training/stair negotiation; Pt left sitting over edge of stretcher in NAD; family at bedside; +tele monitor; OSCAR Cueva aware.

## 2017-04-20 NOTE — OCCUPATIONAL THERAPY INITIAL EVALUATION ADULT - DIAGNOSIS, OT EVAL
r/o CVA; Mild RUE weakness; R eye visual field cut; Decreased functional mobility; Decreased ADL management

## 2017-04-20 NOTE — DISCHARGE NOTE ADULT - CARE PLAN
Principal Discharge DX:	CVA (cerebral vascular accident)  Goal:	prevent further stroke  Instructions for follow-up, activity and diet:	continue aspirin and lipitor   follow up with Neurologist in 1-2 weeks  Secondary Diagnosis:	Hypertension  Instructions for follow-up, activity and diet:	continue antihypertensives, low salt  Secondary Diagnosis:	Hyperlipidemia  Instructions for follow-up, activity and diet:	continue lipitor, low cholesterol  Secondary Diagnosis:	Diabetes  Instructions for follow-up, activity and diet:	consistent carbohydrate diet, continue oral diabetic meds Principal Discharge DX:	CVA (cerebral vascular accident)  Goal:	prevent further stroke  Instructions for follow-up, activity and diet:	continue aspirin and lipitor   follow up with Neurologist Dr. Libman in 1-2 weeks  Secondary Diagnosis:	Hypertension  Instructions for follow-up, activity and diet:	continue antihypertensives, low salt diet  Secondary Diagnosis:	Hyperlipidemia  Instructions for follow-up, activity and diet:	continue lipitor, low cholesterol diet  Secondary Diagnosis:	Diabetes  Instructions for follow-up, activity and diet:	consistent carbohydrate diet, continue oral diabetic meds  Secondary Diagnosis:	Aneurysm of left carotid artery  Instructions for follow-up, activity and diet:	follow up with Neuro Surgery Dr. Wyatt Chatterjee for close monitoring of aneurysms Principal Discharge DX:	CVA (cerebral vascular accident)  Goal:	prevent further stroke  Instructions for follow-up, activity and diet:	continue aspirin and lipitor   follow up with Neurologist Dr. Libman in 1-2 weeks  Secondary Diagnosis:	Hypertension  Instructions for follow-up, activity and diet:	continue antihypertensives, low salt diet  Secondary Diagnosis:	Hyperlipidemia  Instructions for follow-up, activity and diet:	continue lipitor, low cholesterol diet  Secondary Diagnosis:	Diabetes  Instructions for follow-up, activity and diet:	consistent carbohydrate diet, continue oral diabetic meds, follow up with your PCP in 1-2 weeks, your A1c 8.4 and you need better diabetic management  Secondary Diagnosis:	Aneurysm of left carotid artery  Instructions for follow-up, activity and diet:	follow up with Neuro Surgery Dr. Wyatt Chatterjee for close monitoring of aneurysms Principal Discharge DX:	CVA (cerebral vascular accident)  Goal:	prevent further stroke  Instructions for follow-up, activity and diet:	continue aspirin and Lipitor   follow up with Neurologist Dr. Libman in 1-2 weeks  Secondary Diagnosis:	Hypertension  Instructions for follow-up, activity and diet:	continue antihypertensives, low salt diet;  Losartan was increased to 75 mg daily;  follow up with PCP in 1 week  Secondary Diagnosis:	Hyperlipidemia  Instructions for follow-up, activity and diet:	continue Lipitor, low cholesterol diet  Secondary Diagnosis:	Diabetes  Instructions for follow-up, activity and diet:	consistent carbohydrate diet, continue oral diabetic meds, follow up with your PCP in 1-2 weeks, your A1c 8.4 and you need better diabetic management, monitor Fingerstick at home,  Secondary Diagnosis:	Aneurysm of left carotid artery  Instructions for follow-up, activity and diet:	follow up with Neuro Surgery Dr. Wyatt Chatterjee for close monitoring of aneurysms

## 2017-04-20 NOTE — DISCHARGE NOTE ADULT - PROVIDER TOKENS
TOKEN:'[id number]',FREE:[LAST:[Dr. Gonzalez],PHONE:[(   )    -],FAX:[(   )    -]] TOKEN:'[id number]',FREE:[LAST:[Chow],FIRST:[--PCP],PHONE:[(   )    -],FAX:[(   )    -]],TOKEN:'[id number]'

## 2017-04-20 NOTE — PHYSICAL THERAPY INITIAL EVALUATION ADULT - GENERAL OBSERVATIONS, REHAB EVAL
Pt found sitting over edge of stretcher in ED/RADS1; +tele monitor; family at bedside able to translate in Czech.  Pt does speak some English; +decreased vision in visual field right half of right eye. .

## 2017-04-21 LAB
BUN SERPL-MCNC: 25 MG/DL — HIGH (ref 7–23)
CALCIUM SERPL-MCNC: 9.2 MG/DL — SIGNIFICANT CHANGE UP (ref 8.4–10.5)
CHLORIDE SERPL-SCNC: 100 MMOL/L — SIGNIFICANT CHANGE UP (ref 98–107)
CO2 SERPL-SCNC: 25 MMOL/L — SIGNIFICANT CHANGE UP (ref 22–31)
CREAT SERPL-MCNC: 0.95 MG/DL — SIGNIFICANT CHANGE UP (ref 0.5–1.3)
GLUCOSE SERPL-MCNC: 202 MG/DL — HIGH (ref 70–99)
HCT VFR BLD CALC: 38.7 % — SIGNIFICANT CHANGE UP (ref 34.5–45)
HGB BLD-MCNC: 12.3 G/DL — SIGNIFICANT CHANGE UP (ref 11.5–15.5)
MCHC RBC-ENTMCNC: 29.6 PG — SIGNIFICANT CHANGE UP (ref 27–34)
MCHC RBC-ENTMCNC: 31.8 % — LOW (ref 32–36)
MCV RBC AUTO: 93.3 FL — SIGNIFICANT CHANGE UP (ref 80–100)
PLATELET # BLD AUTO: 299 K/UL — SIGNIFICANT CHANGE UP (ref 150–400)
PMV BLD: 10.1 FL — SIGNIFICANT CHANGE UP (ref 7–13)
POTASSIUM SERPL-MCNC: 3.9 MMOL/L — SIGNIFICANT CHANGE UP (ref 3.5–5.3)
POTASSIUM SERPL-SCNC: 3.9 MMOL/L — SIGNIFICANT CHANGE UP (ref 3.5–5.3)
RBC # BLD: 4.15 M/UL — SIGNIFICANT CHANGE UP (ref 3.8–5.2)
RBC # FLD: 13.1 % — SIGNIFICANT CHANGE UP (ref 10.3–14.5)
SODIUM SERPL-SCNC: 140 MMOL/L — SIGNIFICANT CHANGE UP (ref 135–145)
WBC # BLD: 8.72 K/UL — SIGNIFICANT CHANGE UP (ref 3.8–10.5)
WBC # FLD AUTO: 8.72 K/UL — SIGNIFICANT CHANGE UP (ref 3.8–10.5)

## 2017-04-21 PROCEDURE — 99233 SBSQ HOSP IP/OBS HIGH 50: CPT

## 2017-04-21 RX ORDER — CLOPIDOGREL BISULFATE 75 MG/1
75 TABLET, FILM COATED ORAL DAILY
Qty: 0 | Refills: 0 | Status: DISCONTINUED | OUTPATIENT
Start: 2017-04-21 | End: 2017-04-25

## 2017-04-21 RX ORDER — CLOPIDOGREL BISULFATE 75 MG/1
1 TABLET, FILM COATED ORAL
Qty: 21 | Refills: 0 | OUTPATIENT
Start: 2017-04-21 | End: 2017-05-12

## 2017-04-21 RX ORDER — LOSARTAN POTASSIUM 100 MG/1
50 TABLET, FILM COATED ORAL DAILY
Qty: 0 | Refills: 0 | Status: DISCONTINUED | OUTPATIENT
Start: 2017-04-21 | End: 2017-04-24

## 2017-04-21 RX ORDER — ATORVASTATIN CALCIUM 80 MG/1
1 TABLET, FILM COATED ORAL
Qty: 0 | Refills: 0 | COMMUNITY
Start: 2017-04-21

## 2017-04-21 RX ADMIN — HEPARIN SODIUM 5000 UNIT(S): 5000 INJECTION INTRAVENOUS; SUBCUTANEOUS at 06:13

## 2017-04-21 RX ADMIN — Medication 1: at 11:21

## 2017-04-21 RX ADMIN — HEPARIN SODIUM 5000 UNIT(S): 5000 INJECTION INTRAVENOUS; SUBCUTANEOUS at 18:17

## 2017-04-21 RX ADMIN — Medication: at 09:25

## 2017-04-21 RX ADMIN — LOSARTAN POTASSIUM 50 MILLIGRAM(S): 100 TABLET, FILM COATED ORAL at 17:47

## 2017-04-21 RX ADMIN — ATORVASTATIN CALCIUM 80 MILLIGRAM(S): 80 TABLET, FILM COATED ORAL at 22:20

## 2017-04-21 RX ADMIN — Medication 2: at 17:51

## 2017-04-22 LAB
BUN SERPL-MCNC: 25 MG/DL — HIGH (ref 7–23)
CALCIUM SERPL-MCNC: 9.3 MG/DL — SIGNIFICANT CHANGE UP (ref 8.4–10.5)
CHLORIDE SERPL-SCNC: 101 MMOL/L — SIGNIFICANT CHANGE UP (ref 98–107)
CO2 SERPL-SCNC: 24 MMOL/L — SIGNIFICANT CHANGE UP (ref 22–31)
CREAT SERPL-MCNC: 0.95 MG/DL — SIGNIFICANT CHANGE UP (ref 0.5–1.3)
GLUCOSE SERPL-MCNC: 213 MG/DL — HIGH (ref 70–99)
HCT VFR BLD CALC: 38.7 % — SIGNIFICANT CHANGE UP (ref 34.5–45)
HGB BLD-MCNC: 12.3 G/DL — SIGNIFICANT CHANGE UP (ref 11.5–15.5)
MAGNESIUM SERPL-MCNC: 1.7 MG/DL — SIGNIFICANT CHANGE UP (ref 1.6–2.6)
MCHC RBC-ENTMCNC: 29.4 PG — SIGNIFICANT CHANGE UP (ref 27–34)
MCHC RBC-ENTMCNC: 31.8 % — LOW (ref 32–36)
MCV RBC AUTO: 92.6 FL — SIGNIFICANT CHANGE UP (ref 80–100)
PLATELET # BLD AUTO: 304 K/UL — SIGNIFICANT CHANGE UP (ref 150–400)
PMV BLD: 10.4 FL — SIGNIFICANT CHANGE UP (ref 7–13)
POTASSIUM SERPL-MCNC: 3.9 MMOL/L — SIGNIFICANT CHANGE UP (ref 3.5–5.3)
POTASSIUM SERPL-SCNC: 3.9 MMOL/L — SIGNIFICANT CHANGE UP (ref 3.5–5.3)
RBC # BLD: 4.18 M/UL — SIGNIFICANT CHANGE UP (ref 3.8–5.2)
RBC # FLD: 13.2 % — SIGNIFICANT CHANGE UP (ref 10.3–14.5)
SODIUM SERPL-SCNC: 139 MMOL/L — SIGNIFICANT CHANGE UP (ref 135–145)
WBC # BLD: 8.51 K/UL — SIGNIFICANT CHANGE UP (ref 3.8–10.5)
WBC # FLD AUTO: 8.51 K/UL — SIGNIFICANT CHANGE UP (ref 3.8–10.5)

## 2017-04-22 PROCEDURE — 93306 TTE W/DOPPLER COMPLETE: CPT | Mod: 26

## 2017-04-22 PROCEDURE — 99232 SBSQ HOSP IP/OBS MODERATE 35: CPT

## 2017-04-22 RX ADMIN — ATORVASTATIN CALCIUM 80 MILLIGRAM(S): 80 TABLET, FILM COATED ORAL at 21:59

## 2017-04-22 RX ADMIN — LOSARTAN POTASSIUM 50 MILLIGRAM(S): 100 TABLET, FILM COATED ORAL at 05:54

## 2017-04-22 RX ADMIN — Medication 1: at 09:45

## 2017-04-22 RX ADMIN — Medication: at 18:03

## 2017-04-22 RX ADMIN — HEPARIN SODIUM 5000 UNIT(S): 5000 INJECTION INTRAVENOUS; SUBCUTANEOUS at 05:54

## 2017-04-22 RX ADMIN — CLOPIDOGREL BISULFATE 75 MILLIGRAM(S): 75 TABLET, FILM COATED ORAL at 13:18

## 2017-04-22 RX ADMIN — Medication 81 MILLIGRAM(S): at 13:18

## 2017-04-23 LAB
BASOPHILS # BLD AUTO: 0.02 K/UL — SIGNIFICANT CHANGE UP (ref 0–0.2)
BASOPHILS NFR BLD AUTO: 0.2 % — SIGNIFICANT CHANGE UP (ref 0–2)
BUN SERPL-MCNC: 19 MG/DL — SIGNIFICANT CHANGE UP (ref 7–23)
CALCIUM SERPL-MCNC: 9.7 MG/DL — SIGNIFICANT CHANGE UP (ref 8.4–10.5)
CHLORIDE SERPL-SCNC: 101 MMOL/L — SIGNIFICANT CHANGE UP (ref 98–107)
CO2 SERPL-SCNC: 24 MMOL/L — SIGNIFICANT CHANGE UP (ref 22–31)
CREAT SERPL-MCNC: 0.91 MG/DL — SIGNIFICANT CHANGE UP (ref 0.5–1.3)
EOSINOPHIL # BLD AUTO: 0.3 K/UL — SIGNIFICANT CHANGE UP (ref 0–0.5)
EOSINOPHIL NFR BLD AUTO: 3.1 % — SIGNIFICANT CHANGE UP (ref 0–6)
GLUCOSE SERPL-MCNC: 186 MG/DL — HIGH (ref 70–99)
HCT VFR BLD CALC: 40.8 % — SIGNIFICANT CHANGE UP (ref 34.5–45)
HGB BLD-MCNC: 13.1 G/DL — SIGNIFICANT CHANGE UP (ref 11.5–15.5)
IMM GRANULOCYTES NFR BLD AUTO: 0.2 % — SIGNIFICANT CHANGE UP (ref 0–1.5)
LYMPHOCYTES # BLD AUTO: 3.2 K/UL — SIGNIFICANT CHANGE UP (ref 1–3.3)
LYMPHOCYTES # BLD AUTO: 33.2 % — SIGNIFICANT CHANGE UP (ref 13–44)
MAGNESIUM SERPL-MCNC: 1.7 MG/DL — SIGNIFICANT CHANGE UP (ref 1.6–2.6)
MCHC RBC-ENTMCNC: 29.9 PG — SIGNIFICANT CHANGE UP (ref 27–34)
MCHC RBC-ENTMCNC: 32.1 % — SIGNIFICANT CHANGE UP (ref 32–36)
MCV RBC AUTO: 93.2 FL — SIGNIFICANT CHANGE UP (ref 80–100)
MONOCYTES # BLD AUTO: 0.7 K/UL — SIGNIFICANT CHANGE UP (ref 0–0.9)
MONOCYTES NFR BLD AUTO: 7.3 % — SIGNIFICANT CHANGE UP (ref 2–14)
NEUTROPHILS # BLD AUTO: 5.4 K/UL — SIGNIFICANT CHANGE UP (ref 1.8–7.4)
NEUTROPHILS NFR BLD AUTO: 56 % — SIGNIFICANT CHANGE UP (ref 43–77)
PLATELET # BLD AUTO: 314 K/UL — SIGNIFICANT CHANGE UP (ref 150–400)
PMV BLD: 10.5 FL — SIGNIFICANT CHANGE UP (ref 7–13)
POTASSIUM SERPL-MCNC: 4.1 MMOL/L — SIGNIFICANT CHANGE UP (ref 3.5–5.3)
POTASSIUM SERPL-SCNC: 4.1 MMOL/L — SIGNIFICANT CHANGE UP (ref 3.5–5.3)
RBC # BLD: 4.38 M/UL — SIGNIFICANT CHANGE UP (ref 3.8–5.2)
RBC # FLD: 13 % — SIGNIFICANT CHANGE UP (ref 10.3–14.5)
SODIUM SERPL-SCNC: 139 MMOL/L — SIGNIFICANT CHANGE UP (ref 135–145)
WBC # BLD: 9.64 K/UL — SIGNIFICANT CHANGE UP (ref 3.8–10.5)
WBC # FLD AUTO: 9.64 K/UL — SIGNIFICANT CHANGE UP (ref 3.8–10.5)

## 2017-04-23 PROCEDURE — 99239 HOSP IP/OBS DSCHRG MGMT >30: CPT

## 2017-04-23 RX ORDER — LOSARTAN POTASSIUM 100 MG/1
25 TABLET, FILM COATED ORAL ONCE
Qty: 0 | Refills: 0 | Status: COMPLETED | OUTPATIENT
Start: 2017-04-23 | End: 2017-04-23

## 2017-04-23 RX ADMIN — Medication: at 09:30

## 2017-04-23 RX ADMIN — Medication 81 MILLIGRAM(S): at 13:02

## 2017-04-23 RX ADMIN — Medication: at 13:02

## 2017-04-23 RX ADMIN — CLOPIDOGREL BISULFATE 75 MILLIGRAM(S): 75 TABLET, FILM COATED ORAL at 13:02

## 2017-04-23 RX ADMIN — HEPARIN SODIUM 5000 UNIT(S): 5000 INJECTION INTRAVENOUS; SUBCUTANEOUS at 05:34

## 2017-04-23 RX ADMIN — LOSARTAN POTASSIUM 50 MILLIGRAM(S): 100 TABLET, FILM COATED ORAL at 05:34

## 2017-04-23 RX ADMIN — HEPARIN SODIUM 5000 UNIT(S): 5000 INJECTION INTRAVENOUS; SUBCUTANEOUS at 17:42

## 2017-04-23 RX ADMIN — ATORVASTATIN CALCIUM 80 MILLIGRAM(S): 80 TABLET, FILM COATED ORAL at 22:04

## 2017-04-23 RX ADMIN — LOSARTAN POTASSIUM 25 MILLIGRAM(S): 100 TABLET, FILM COATED ORAL at 17:42

## 2017-04-24 LAB
BASOPHILS # BLD AUTO: 0.04 K/UL — SIGNIFICANT CHANGE UP (ref 0–0.2)
BASOPHILS NFR BLD AUTO: 0.4 % — SIGNIFICANT CHANGE UP (ref 0–2)
BUN SERPL-MCNC: 23 MG/DL — SIGNIFICANT CHANGE UP (ref 7–23)
CALCIUM SERPL-MCNC: 9.5 MG/DL — SIGNIFICANT CHANGE UP (ref 8.4–10.5)
CHLORIDE SERPL-SCNC: 101 MMOL/L — SIGNIFICANT CHANGE UP (ref 98–107)
CO2 SERPL-SCNC: 24 MMOL/L — SIGNIFICANT CHANGE UP (ref 22–31)
CREAT SERPL-MCNC: 1 MG/DL — SIGNIFICANT CHANGE UP (ref 0.5–1.3)
EOSINOPHIL # BLD AUTO: 0.31 K/UL — SIGNIFICANT CHANGE UP (ref 0–0.5)
EOSINOPHIL NFR BLD AUTO: 3.2 % — SIGNIFICANT CHANGE UP (ref 0–6)
GLUCOSE SERPL-MCNC: 227 MG/DL — HIGH (ref 70–99)
HCT VFR BLD CALC: 38.7 % — SIGNIFICANT CHANGE UP (ref 34.5–45)
HGB BLD-MCNC: 12.4 G/DL — SIGNIFICANT CHANGE UP (ref 11.5–15.5)
IMM GRANULOCYTES NFR BLD AUTO: 0.4 % — SIGNIFICANT CHANGE UP (ref 0–1.5)
LYMPHOCYTES # BLD AUTO: 3.07 K/UL — SIGNIFICANT CHANGE UP (ref 1–3.3)
LYMPHOCYTES # BLD AUTO: 32.2 % — SIGNIFICANT CHANGE UP (ref 13–44)
MAGNESIUM SERPL-MCNC: 1.8 MG/DL — SIGNIFICANT CHANGE UP (ref 1.6–2.6)
MCHC RBC-ENTMCNC: 29.7 PG — SIGNIFICANT CHANGE UP (ref 27–34)
MCHC RBC-ENTMCNC: 32 % — SIGNIFICANT CHANGE UP (ref 32–36)
MCV RBC AUTO: 92.8 FL — SIGNIFICANT CHANGE UP (ref 80–100)
MONOCYTES # BLD AUTO: 0.7 K/UL — SIGNIFICANT CHANGE UP (ref 0–0.9)
MONOCYTES NFR BLD AUTO: 7.3 % — SIGNIFICANT CHANGE UP (ref 2–14)
NEUTROPHILS # BLD AUTO: 5.38 K/UL — SIGNIFICANT CHANGE UP (ref 1.8–7.4)
NEUTROPHILS NFR BLD AUTO: 56.5 % — SIGNIFICANT CHANGE UP (ref 43–77)
PLATELET # BLD AUTO: 289 K/UL — SIGNIFICANT CHANGE UP (ref 150–400)
PMV BLD: 10.6 FL — SIGNIFICANT CHANGE UP (ref 7–13)
POTASSIUM SERPL-MCNC: 4.1 MMOL/L — SIGNIFICANT CHANGE UP (ref 3.5–5.3)
POTASSIUM SERPL-SCNC: 4.1 MMOL/L — SIGNIFICANT CHANGE UP (ref 3.5–5.3)
RBC # BLD: 4.17 M/UL — SIGNIFICANT CHANGE UP (ref 3.8–5.2)
RBC # FLD: 13 % — SIGNIFICANT CHANGE UP (ref 10.3–14.5)
SODIUM SERPL-SCNC: 138 MMOL/L — SIGNIFICANT CHANGE UP (ref 135–145)
WBC # BLD: 9.54 K/UL — SIGNIFICANT CHANGE UP (ref 3.8–10.5)
WBC # FLD AUTO: 9.54 K/UL — SIGNIFICANT CHANGE UP (ref 3.8–10.5)

## 2017-04-24 PROCEDURE — 99233 SBSQ HOSP IP/OBS HIGH 50: CPT

## 2017-04-24 RX ORDER — MAGNESIUM OXIDE 400 MG ORAL TABLET 241.3 MG
400 TABLET ORAL
Qty: 0 | Refills: 0 | Status: DISCONTINUED | OUTPATIENT
Start: 2017-04-24 | End: 2017-04-25

## 2017-04-24 RX ORDER — LOSARTAN POTASSIUM 100 MG/1
75 TABLET, FILM COATED ORAL DAILY
Qty: 0 | Refills: 0 | Status: DISCONTINUED | OUTPATIENT
Start: 2017-04-25 | End: 2017-04-25

## 2017-04-24 RX ORDER — LOSARTAN POTASSIUM 100 MG/1
25 TABLET, FILM COATED ORAL ONCE
Qty: 0 | Refills: 0 | Status: COMPLETED | OUTPATIENT
Start: 2017-04-24 | End: 2017-04-24

## 2017-04-24 RX ORDER — LOSARTAN POTASSIUM 100 MG/1
3 TABLET, FILM COATED ORAL
Qty: 0 | Refills: 0 | COMMUNITY
Start: 2017-04-24

## 2017-04-24 RX ADMIN — ATORVASTATIN CALCIUM 80 MILLIGRAM(S): 80 TABLET, FILM COATED ORAL at 23:06

## 2017-04-24 RX ADMIN — LOSARTAN POTASSIUM 25 MILLIGRAM(S): 100 TABLET, FILM COATED ORAL at 13:05

## 2017-04-24 RX ADMIN — Medication: at 18:09

## 2017-04-24 RX ADMIN — Medication 81 MILLIGRAM(S): at 13:06

## 2017-04-24 RX ADMIN — Medication: at 13:40

## 2017-04-24 RX ADMIN — Medication 3: at 23:28

## 2017-04-24 RX ADMIN — LOSARTAN POTASSIUM 25 MILLIGRAM(S): 100 TABLET, FILM COATED ORAL at 16:25

## 2017-04-24 RX ADMIN — LOSARTAN POTASSIUM 50 MILLIGRAM(S): 100 TABLET, FILM COATED ORAL at 05:57

## 2017-04-24 RX ADMIN — HEPARIN SODIUM 5000 UNIT(S): 5000 INJECTION INTRAVENOUS; SUBCUTANEOUS at 05:57

## 2017-04-24 RX ADMIN — MAGNESIUM OXIDE 400 MG ORAL TABLET 400 MILLIGRAM(S): 241.3 TABLET ORAL at 18:07

## 2017-04-24 RX ADMIN — Medication: at 09:51

## 2017-04-24 RX ADMIN — CLOPIDOGREL BISULFATE 75 MILLIGRAM(S): 75 TABLET, FILM COATED ORAL at 13:06

## 2017-04-24 RX ADMIN — HEPARIN SODIUM 5000 UNIT(S): 5000 INJECTION INTRAVENOUS; SUBCUTANEOUS at 18:06

## 2017-04-25 VITALS — TEMPERATURE: 98 F

## 2017-04-25 LAB
BASOPHILS # BLD AUTO: 0.04 K/UL — SIGNIFICANT CHANGE UP (ref 0–0.2)
BASOPHILS NFR BLD AUTO: 0.4 % — SIGNIFICANT CHANGE UP (ref 0–2)
BUN SERPL-MCNC: 25 MG/DL — HIGH (ref 7–23)
BUN SERPL-MCNC: 25 MG/DL — HIGH (ref 7–23)
CALCIUM SERPL-MCNC: 9.2 MG/DL — SIGNIFICANT CHANGE UP (ref 8.4–10.5)
CALCIUM SERPL-MCNC: 9.2 MG/DL — SIGNIFICANT CHANGE UP (ref 8.4–10.5)
CHLORIDE SERPL-SCNC: 102 MMOL/L — SIGNIFICANT CHANGE UP (ref 98–107)
CHLORIDE SERPL-SCNC: 102 MMOL/L — SIGNIFICANT CHANGE UP (ref 98–107)
CO2 SERPL-SCNC: 22 MMOL/L — SIGNIFICANT CHANGE UP (ref 22–31)
CO2 SERPL-SCNC: 22 MMOL/L — SIGNIFICANT CHANGE UP (ref 22–31)
CREAT SERPL-MCNC: 0.96 MG/DL — SIGNIFICANT CHANGE UP (ref 0.5–1.3)
CREAT SERPL-MCNC: 0.96 MG/DL — SIGNIFICANT CHANGE UP (ref 0.5–1.3)
EOSINOPHIL # BLD AUTO: 0.28 K/UL — SIGNIFICANT CHANGE UP (ref 0–0.5)
EOSINOPHIL NFR BLD AUTO: 2.7 % — SIGNIFICANT CHANGE UP (ref 0–6)
GLUCOSE SERPL-MCNC: 257 MG/DL — HIGH (ref 70–99)
GLUCOSE SERPL-MCNC: 257 MG/DL — HIGH (ref 70–99)
HCT VFR BLD CALC: 38 % — SIGNIFICANT CHANGE UP (ref 34.5–45)
HCT VFR BLD CALC: 38 % — SIGNIFICANT CHANGE UP (ref 34.5–45)
HGB BLD-MCNC: 12.3 G/DL — SIGNIFICANT CHANGE UP (ref 11.5–15.5)
HGB BLD-MCNC: 12.3 G/DL — SIGNIFICANT CHANGE UP (ref 11.5–15.5)
IMM GRANULOCYTES NFR BLD AUTO: 0.4 % — SIGNIFICANT CHANGE UP (ref 0–1.5)
LYMPHOCYTES # BLD AUTO: 2.74 K/UL — SIGNIFICANT CHANGE UP (ref 1–3.3)
LYMPHOCYTES # BLD AUTO: 26.3 % — SIGNIFICANT CHANGE UP (ref 13–44)
MAGNESIUM SERPL-MCNC: 1.8 MG/DL — SIGNIFICANT CHANGE UP (ref 1.6–2.6)
MCHC RBC-ENTMCNC: 29.9 PG — SIGNIFICANT CHANGE UP (ref 27–34)
MCHC RBC-ENTMCNC: 29.9 PG — SIGNIFICANT CHANGE UP (ref 27–34)
MCHC RBC-ENTMCNC: 32.4 % — SIGNIFICANT CHANGE UP (ref 32–36)
MCHC RBC-ENTMCNC: 32.4 % — SIGNIFICANT CHANGE UP (ref 32–36)
MCV RBC AUTO: 92.2 FL — SIGNIFICANT CHANGE UP (ref 80–100)
MCV RBC AUTO: 92.2 FL — SIGNIFICANT CHANGE UP (ref 80–100)
MONOCYTES # BLD AUTO: 0.78 K/UL — SIGNIFICANT CHANGE UP (ref 0–0.9)
MONOCYTES NFR BLD AUTO: 7.5 % — SIGNIFICANT CHANGE UP (ref 2–14)
NEUTROPHILS # BLD AUTO: 6.52 K/UL — SIGNIFICANT CHANGE UP (ref 1.8–7.4)
NEUTROPHILS NFR BLD AUTO: 62.7 % — SIGNIFICANT CHANGE UP (ref 43–77)
PLATELET # BLD AUTO: 273 K/UL — SIGNIFICANT CHANGE UP (ref 150–400)
PLATELET # BLD AUTO: 273 K/UL — SIGNIFICANT CHANGE UP (ref 150–400)
PMV BLD: 10.4 FL — SIGNIFICANT CHANGE UP (ref 7–13)
PMV BLD: 10.4 FL — SIGNIFICANT CHANGE UP (ref 7–13)
POTASSIUM SERPL-MCNC: 4.3 MMOL/L — SIGNIFICANT CHANGE UP (ref 3.5–5.3)
POTASSIUM SERPL-MCNC: 4.3 MMOL/L — SIGNIFICANT CHANGE UP (ref 3.5–5.3)
POTASSIUM SERPL-SCNC: 4.3 MMOL/L — SIGNIFICANT CHANGE UP (ref 3.5–5.3)
POTASSIUM SERPL-SCNC: 4.3 MMOL/L — SIGNIFICANT CHANGE UP (ref 3.5–5.3)
RBC # BLD: 4.12 M/UL — SIGNIFICANT CHANGE UP (ref 3.8–5.2)
RBC # BLD: 4.12 M/UL — SIGNIFICANT CHANGE UP (ref 3.8–5.2)
RBC # FLD: 13.2 % — SIGNIFICANT CHANGE UP (ref 10.3–14.5)
RBC # FLD: 13.2 % — SIGNIFICANT CHANGE UP (ref 10.3–14.5)
SODIUM SERPL-SCNC: 138 MMOL/L — SIGNIFICANT CHANGE UP (ref 135–145)
SODIUM SERPL-SCNC: 138 MMOL/L — SIGNIFICANT CHANGE UP (ref 135–145)
WBC # BLD: 10.4 K/UL — SIGNIFICANT CHANGE UP (ref 3.8–10.5)
WBC # BLD: 10.4 K/UL — SIGNIFICANT CHANGE UP (ref 3.8–10.5)
WBC # FLD AUTO: 10.4 K/UL — SIGNIFICANT CHANGE UP (ref 3.8–10.5)
WBC # FLD AUTO: 10.4 K/UL — SIGNIFICANT CHANGE UP (ref 3.8–10.5)

## 2017-04-25 PROCEDURE — 99232 SBSQ HOSP IP/OBS MODERATE 35: CPT

## 2017-04-25 PROCEDURE — 99239 HOSP IP/OBS DSCHRG MGMT >30: CPT

## 2017-04-25 RX ORDER — LOSARTAN POTASSIUM 100 MG/1
25 TABLET, FILM COATED ORAL ONCE
Qty: 0 | Refills: 0 | Status: COMPLETED | OUTPATIENT
Start: 2017-04-25 | End: 2017-04-25

## 2017-04-25 RX ORDER — LOSARTAN POTASSIUM 100 MG/1
1 TABLET, FILM COATED ORAL
Qty: 30 | Refills: 0 | OUTPATIENT
Start: 2017-04-25 | End: 2017-05-25

## 2017-04-25 RX ORDER — LOSARTAN POTASSIUM 100 MG/1
100 TABLET, FILM COATED ORAL DAILY
Qty: 0 | Refills: 0 | Status: DISCONTINUED | OUTPATIENT
Start: 2017-04-25 | End: 2017-04-25

## 2017-04-25 RX ADMIN — HEPARIN SODIUM 5000 UNIT(S): 5000 INJECTION INTRAVENOUS; SUBCUTANEOUS at 06:48

## 2017-04-25 RX ADMIN — Medication 81 MILLIGRAM(S): at 13:54

## 2017-04-25 RX ADMIN — Medication 5: at 13:53

## 2017-04-25 RX ADMIN — LOSARTAN POTASSIUM 75 MILLIGRAM(S): 100 TABLET, FILM COATED ORAL at 06:48

## 2017-04-25 RX ADMIN — Medication 2: at 09:44

## 2017-04-25 RX ADMIN — LOSARTAN POTASSIUM 25 MILLIGRAM(S): 100 TABLET, FILM COATED ORAL at 13:54

## 2017-04-25 RX ADMIN — MAGNESIUM OXIDE 400 MG ORAL TABLET 400 MILLIGRAM(S): 241.3 TABLET ORAL at 10:46

## 2017-04-25 RX ADMIN — CLOPIDOGREL BISULFATE 75 MILLIGRAM(S): 75 TABLET, FILM COATED ORAL at 13:54

## 2017-04-26 RX ORDER — LOSARTAN POTASSIUM 100 MG/1
1 TABLET, FILM COATED ORAL
Qty: 30 | Refills: 0 | OUTPATIENT
Start: 2017-04-26 | End: 2017-05-26

## 2017-04-26 RX ORDER — CLOPIDOGREL BISULFATE 75 MG/1
1 TABLET, FILM COATED ORAL
Qty: 30 | Refills: 0 | OUTPATIENT
Start: 2017-04-26 | End: 2017-05-26

## 2017-04-29 RX ORDER — ASPIRIN/CALCIUM CARB/MAGNESIUM 324 MG
1 TABLET ORAL
Qty: 0 | Refills: 0 | COMMUNITY

## 2017-04-29 RX ORDER — METFORMIN HYDROCHLORIDE 850 MG/1
1 TABLET ORAL
Qty: 0 | Refills: 0 | COMMUNITY

## 2017-04-29 RX ORDER — ATORVASTATIN CALCIUM 80 MG/1
1 TABLET, FILM COATED ORAL
Qty: 0 | Refills: 0 | COMMUNITY

## 2017-04-29 RX ORDER — LOSARTAN POTASSIUM 100 MG/1
1 TABLET, FILM COATED ORAL
Qty: 0 | Refills: 0 | COMMUNITY

## 2017-04-29 RX ORDER — METOPROLOL TARTRATE 50 MG
1 TABLET ORAL
Qty: 0 | Refills: 0 | COMMUNITY

## 2017-05-05 ENCOUNTER — APPOINTMENT (OUTPATIENT)
Dept: ELECTROPHYSIOLOGY | Facility: CLINIC | Age: 72
End: 2017-05-05

## 2017-05-05 DIAGNOSIS — I10 ESSENTIAL (PRIMARY) HYPERTENSION: ICD-10-CM

## 2017-05-05 DIAGNOSIS — E11.9 TYPE 2 DIABETES MELLITUS W/OUT COMPLICATIONS: ICD-10-CM

## 2017-05-05 DIAGNOSIS — E78.5 HYPERLIPIDEMIA, UNSPECIFIED: ICD-10-CM

## 2017-05-05 RX ORDER — ASPIRIN ENTERIC COATED TABLETS 81 MG 81 MG/1
81 TABLET, DELAYED RELEASE ORAL DAILY
Refills: 0 | Status: ACTIVE | COMMUNITY

## 2017-05-05 RX ORDER — CLOPIDOGREL BISULFATE 75 MG/1
75 TABLET, FILM COATED ORAL DAILY
Refills: 0 | Status: ACTIVE | COMMUNITY

## 2017-05-05 RX ORDER — GLIPIZIDE 10 MG/1
10 TABLET ORAL
Refills: 0 | Status: ACTIVE | COMMUNITY

## 2017-05-05 RX ORDER — METFORMIN HYDROCHLORIDE 1000 MG/1
1000 TABLET, EXTENDED RELEASE ORAL
Refills: 0 | Status: ACTIVE | COMMUNITY

## 2017-05-05 RX ORDER — LOSARTAN POTASSIUM 100 MG/1
100 TABLET, FILM COATED ORAL DAILY
Refills: 0 | Status: ACTIVE | COMMUNITY

## 2017-05-05 RX ORDER — ATORVASTATIN CALCIUM 80 MG/1
80 TABLET, FILM COATED ORAL
Refills: 0 | Status: ACTIVE | COMMUNITY

## 2017-06-05 ENCOUNTER — APPOINTMENT (OUTPATIENT)
Dept: ELECTROPHYSIOLOGY | Facility: CLINIC | Age: 72
End: 2017-06-05

## 2017-07-10 ENCOUNTER — APPOINTMENT (OUTPATIENT)
Dept: ELECTROPHYSIOLOGY | Facility: CLINIC | Age: 72
End: 2017-07-10
Payer: MEDICARE

## 2017-07-10 PROCEDURE — 93298 REM INTERROG DEV EVAL SCRMS: CPT

## 2017-07-10 PROCEDURE — 93299: CPT

## 2017-08-24 ENCOUNTER — APPOINTMENT (OUTPATIENT)
Dept: ELECTROPHYSIOLOGY | Facility: CLINIC | Age: 72
End: 2017-08-24

## 2017-09-25 ENCOUNTER — APPOINTMENT (OUTPATIENT)
Dept: ELECTROPHYSIOLOGY | Facility: CLINIC | Age: 72
End: 2017-09-25
Payer: MEDICARE

## 2017-09-25 PROCEDURE — 93299: CPT

## 2017-09-25 PROCEDURE — 93298 REM INTERROG DEV EVAL SCRMS: CPT

## 2017-11-09 ENCOUNTER — APPOINTMENT (OUTPATIENT)
Dept: ELECTROPHYSIOLOGY | Facility: CLINIC | Age: 72
End: 2017-11-09

## 2017-11-22 ENCOUNTER — APPOINTMENT (OUTPATIENT)
Dept: ELECTROPHYSIOLOGY | Facility: CLINIC | Age: 72
End: 2017-11-22
Payer: MEDICARE

## 2017-11-22 PROCEDURE — 93298 REM INTERROG DEV EVAL SCRMS: CPT

## 2017-11-22 PROCEDURE — 93299: CPT

## 2018-01-22 ENCOUNTER — APPOINTMENT (OUTPATIENT)
Dept: ELECTROPHYSIOLOGY | Facility: CLINIC | Age: 73
End: 2018-01-22
Payer: MEDICARE

## 2018-01-22 DIAGNOSIS — I63.9 CEREBRAL INFARCTION, UNSPECIFIED: ICD-10-CM

## 2018-01-22 PROCEDURE — 93298 REM INTERROG DEV EVAL SCRMS: CPT

## 2018-01-22 PROCEDURE — 93299: CPT

## 2018-02-16 PROBLEM — I63.9 CVA (CEREBRAL VASCULAR ACCIDENT): Status: ACTIVE | Noted: 2017-05-05

## 2018-03-06 ENCOUNTER — APPOINTMENT (OUTPATIENT)
Dept: ELECTROPHYSIOLOGY | Facility: CLINIC | Age: 73
End: 2018-03-06

## 2020-01-09 ENCOUNTER — EMERGENCY (EMERGENCY)
Facility: HOSPITAL | Age: 75
LOS: 1 days | Discharge: LEFT BEFORE DISCHARGE | End: 2020-01-09
Admitting: EMERGENCY MEDICINE

## 2020-01-09 VITALS
HEART RATE: 72 BPM | SYSTOLIC BLOOD PRESSURE: 154 MMHG | TEMPERATURE: 99 F | DIASTOLIC BLOOD PRESSURE: 64 MMHG | RESPIRATION RATE: 18 BRPM | OXYGEN SATURATION: 97 %

## 2020-01-09 DIAGNOSIS — Z90.49 ACQUIRED ABSENCE OF OTHER SPECIFIED PARTS OF DIGESTIVE TRACT: Chronic | ICD-10-CM

## 2020-01-09 NOTE — ED ADULT TRIAGE NOTE - CHIEF COMPLAINT QUOTE
PMH of HTN, DM, stroke, high cholesterol c/o headache, lightheadedness, nausea. Took Tylenol around 8:30Pm with some relief. Also c/o pain in her left shoulder . Had stroke 2 years ago and had some loss of peripheral vision. Pt feels like she is having a stroke. CLARITA evaluated pt . Denies CP or SOB or weakness or numbness or vision changes.

## 2020-01-10 PROBLEM — I10 ESSENTIAL (PRIMARY) HYPERTENSION: Chronic | Status: ACTIVE | Noted: 2017-04-19

## 2020-01-10 PROBLEM — E78.5 HYPERLIPIDEMIA, UNSPECIFIED: Chronic | Status: ACTIVE | Noted: 2017-04-19

## 2021-06-09 NOTE — H&P ADULT. - NS MD HP PULSE RADIAL
-- Message is from the [facility]--    Called and left voicemail to schedule post hospital follow appointment.     Hospital:  [facility]  Discharge date:  6/8/21  Appointment needed with PCP by: 6/11/21     ACC AGENT: If patient calls back, follow Guided Workflow to schedule the PHF appointment.    Escalate to leadership if unable to schedule an appointment within that timeframe.   right normal/left normal

## 2024-06-05 NOTE — H&P ADULT. - NEGATIVE OPHTHALMOLOGIC SYMPTOMS
[de-identified] : 76-year-old female patient returns with lower back pain. She notes her lower back has been doing well. She notes she has been going to physical therapy. She notes she feels that she is improving. Patient denies that she has any numbness or tingling. She denies radiating pain down her legs. She sates she would like a new script for PT. She notes she uses 5 pounds weights for her knees. She notes she is favoring her right hip. She is s/p right TKR done on 01/31/23. She is thrilled with her progress and results. She is known to have stenosis of the lumbosacral spine.  PMHx of osteoporosis and refuses treatments, took Boniva in the past.   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    Radiology Results:  o Right Knee : Standing AP, lateral and tunnel views of the knee were obtained and revealed excellent position of TKR with slight lateral tracking of the patella no change from her previous X-rays   Radiology Results: 08/04/2023 o Right Knee : Standing AP, lateral and tunnel views of the knee were obtained and revealed excellent position of TKR with slight lateral tracking of the patella   Radiology Results: 05/26/2023 o Right Knee : Standing AP, Lateral and skyline views of the knee were obtained and revealed excellent position of her right total knee replacement with sight lateral tracking of the patella.   Radiology Results from 5/9/2022: o Left Knee : Standing AP, lateral, tunnel, and skyline views of the knee were obtained and reveal severe lateral and patellofemoral arthritis. 
no loss of vision L/no blurred vision R/no diplopia/no blurred vision L/no lacrimation L/no irritation R/no photophobia/no pain R/no scleral injection L/no irritation L/no discharge R/no pain L/no lacrimation R/no discharge L/no scleral injection R